# Patient Record
Sex: FEMALE | Race: BLACK OR AFRICAN AMERICAN | NOT HISPANIC OR LATINO | Employment: FULL TIME | URBAN - METROPOLITAN AREA
[De-identification: names, ages, dates, MRNs, and addresses within clinical notes are randomized per-mention and may not be internally consistent; named-entity substitution may affect disease eponyms.]

---

## 2019-02-07 ENCOUNTER — APPOINTMENT (OUTPATIENT)
Dept: LAB | Facility: CLINIC | Age: 24
End: 2019-02-07
Payer: COMMERCIAL

## 2019-02-07 ENCOUNTER — OFFICE VISIT (OUTPATIENT)
Dept: INTERNAL MEDICINE CLINIC | Facility: CLINIC | Age: 24
End: 2019-02-07
Payer: COMMERCIAL

## 2019-02-07 VITALS
DIASTOLIC BLOOD PRESSURE: 74 MMHG | RESPIRATION RATE: 12 BRPM | WEIGHT: 144.8 LBS | BODY MASS INDEX: 24.12 KG/M2 | HEART RATE: 84 BPM | SYSTOLIC BLOOD PRESSURE: 100 MMHG | HEIGHT: 65 IN

## 2019-02-07 DIAGNOSIS — Z00.00 ADULT GENERAL MEDICAL EXAMINATION: ICD-10-CM

## 2019-02-07 DIAGNOSIS — Z13.0 SCREENING FOR ENDOCRINE, NUTRITIONAL, METABOLIC AND IMMUNITY DISORDER: ICD-10-CM

## 2019-02-07 DIAGNOSIS — Z13.228 SCREENING FOR ENDOCRINE, NUTRITIONAL, METABOLIC AND IMMUNITY DISORDER: ICD-10-CM

## 2019-02-07 DIAGNOSIS — Z13.21 SCREENING FOR ENDOCRINE, NUTRITIONAL, METABOLIC AND IMMUNITY DISORDER: ICD-10-CM

## 2019-02-07 DIAGNOSIS — Z13.6 SCREENING FOR CARDIOVASCULAR CONDITION: ICD-10-CM

## 2019-02-07 DIAGNOSIS — Z13.29 SCREENING FOR ENDOCRINE, NUTRITIONAL, METABOLIC AND IMMUNITY DISORDER: ICD-10-CM

## 2019-02-07 DIAGNOSIS — Z11.1 SCREENING FOR TUBERCULOSIS: ICD-10-CM

## 2019-02-07 DIAGNOSIS — Z00.00 ADULT GENERAL MEDICAL EXAMINATION: Primary | ICD-10-CM

## 2019-02-07 LAB
ANION GAP SERPL CALCULATED.3IONS-SCNC: 5 MMOL/L (ref 4–13)
BUN SERPL-MCNC: 11 MG/DL (ref 5–25)
CALCIUM SERPL-MCNC: 8.2 MG/DL (ref 8.3–10.1)
CHLORIDE SERPL-SCNC: 109 MMOL/L (ref 100–108)
CHOLEST SERPL-MCNC: 111 MG/DL (ref 50–200)
CO2 SERPL-SCNC: 24 MMOL/L (ref 21–32)
CREAT SERPL-MCNC: 0.74 MG/DL (ref 0.6–1.3)
ERYTHROCYTE [DISTWIDTH] IN BLOOD BY AUTOMATED COUNT: 16.8 % (ref 11.6–15.1)
GFR SERPL CREATININE-BSD FRML MDRD: 132 ML/MIN/1.73SQ M
GLUCOSE P FAST SERPL-MCNC: 86 MG/DL (ref 65–99)
HCT VFR BLD AUTO: 32.2 % (ref 34.8–46.1)
HDLC SERPL-MCNC: 41 MG/DL (ref 40–60)
HGB BLD-MCNC: 9.5 G/DL (ref 11.5–15.4)
LDLC SERPL CALC-MCNC: 60 MG/DL (ref 0–100)
MCH RBC QN AUTO: 22.7 PG (ref 26.8–34.3)
MCHC RBC AUTO-ENTMCNC: 29.5 G/DL (ref 31.4–37.4)
MCV RBC AUTO: 77 FL (ref 82–98)
NONHDLC SERPL-MCNC: 70 MG/DL
PLATELET # BLD AUTO: 377 THOUSANDS/UL (ref 149–390)
PMV BLD AUTO: 9.7 FL (ref 8.9–12.7)
POTASSIUM SERPL-SCNC: 3.7 MMOL/L (ref 3.5–5.3)
RBC # BLD AUTO: 4.18 MILLION/UL (ref 3.81–5.12)
SODIUM SERPL-SCNC: 138 MMOL/L (ref 136–145)
TRIGL SERPL-MCNC: 51 MG/DL
WBC # BLD AUTO: 6.02 THOUSAND/UL (ref 4.31–10.16)

## 2019-02-07 PROCEDURE — 80061 LIPID PANEL: CPT

## 2019-02-07 PROCEDURE — 99385 PREV VISIT NEW AGE 18-39: CPT | Performed by: INTERNAL MEDICINE

## 2019-02-07 PROCEDURE — 86480 TB TEST CELL IMMUN MEASURE: CPT

## 2019-02-07 PROCEDURE — 85027 COMPLETE CBC AUTOMATED: CPT

## 2019-02-07 PROCEDURE — 36415 COLL VENOUS BLD VENIPUNCTURE: CPT

## 2019-02-07 PROCEDURE — 80048 BASIC METABOLIC PNL TOTAL CA: CPT

## 2019-02-07 NOTE — PATIENT INSTRUCTIONS

## 2019-02-07 NOTE — PROGRESS NOTES
ADULT ANNUAL PHYSICAL  [facility] - [facility] [address] [place] [address]    NAME: Linette Molina  AGE: 21 y o  SEX: female  : 1995     DATE: 2019     Assessment and Plan:     1  Adult general medical examination  2  Screening for endocrine, nutritional, metabolic and immunity disorder  3  Screening for tuberculosis  4  Screening for cardiovascular condition    Orders Placed This Encounter   Procedures    Basic metabolic panel    CBC and Platelet    Lipid panel    Quantiferon TB Gold Plus     Health maintenance and preventative care screenings were discussed with patient today  Appropriate education was printed on patient's after visit summary  ·  Patient is up-to-date with their preventive screenings  · Immunizations were reviewed: patient is up-to-date with her immunizations  Counseling:  Dental Health: discussed importance of regular tooth brushing, flossing, and dental visits  BMI Counseling: Body mass index is 24 42 kg/m²  Discussed the patient's BMI with her  The BMI is in the acceptable range  Sexual health: discussed sexually transmitted diseases, partner selection, use of condoms, avoidance of unintended pregnancy, and contraceptive alternatives  · Alcohol/drug use: discussed moderation in alcohol intake and avoidance of illicit drug use  Chief Complaint:     Chief Complaint   Patient presents with    [facility]      History of Present Illness:     Adult Annual Physical   Patient here for a comprehensive physical exam  The patient reports no problems  She needs testing for school for Tb  She is studying to become a medical assistant  Currently works at [employer]  Denies any medical problems  Has no concerns  Diet and Physical Activity  · Diet/Nutrition: well balanced diet  · Weight concerns: none, patient's BMI is between 18 5-24 9  · Exercise: moderate cardiovascular exercise        Depression Screening  PHQ-9 Depression Screening    PHQ-9:    Frequency of the following problems over the past two weeks:       Little interest or pleasure in doing things:  0 - not at all  Feeling down, depressed, or hopeless:  0 - not at all  PHQ-2 Score:  0       General Health  · Sleep: sleeps well  · Hearing: normal - bilateral   · Vision: goes for regular eye exams, wears glasses and wears contacts  · Dental: regular dental visits and brushes teeth twice daily  /GYN Health  · Last menstrual period: 1/7/2019  · History of STDs?: no      Review of Systems:     Review of Systems   Constitutional: Negative for appetite change, chills, fatigue and fever  HENT: Negative for congestion, hearing loss, postnasal drip, rhinorrhea, sore throat, tinnitus and trouble swallowing  Eyes: Negative for pain, discharge, redness and visual disturbance  Respiratory: Negative for cough, chest tightness, shortness of breath and wheezing  Cardiovascular: Negative for chest pain, palpitations and leg swelling  Gastrointestinal: Negative for abdominal distention, abdominal pain, blood in stool, constipation, diarrhea, nausea and vomiting  Endocrine: Negative for cold intolerance, heat intolerance, polydipsia, polyphagia and polyuria  Genitourinary: Negative for difficulty urinating, dysuria, frequency, hematuria and urgency  Musculoskeletal: Negative for arthralgias, back pain, gait problem, joint swelling, myalgias, neck pain and neck stiffness  Skin: Negative for color change and rash  Neurological: Negative for dizziness, tremors, seizures, syncope, speech difficulty, weakness, light-headedness, numbness and headaches  Hematological: Negative for adenopathy  Does not bruise/bleed easily  Psychiatric/Behavioral: Negative for agitation, behavioral problems, confusion, hallucinations, sleep disturbance and suicidal ideas  The patient is not nervous/anxious  Past Medical History:     History reviewed  No pertinent past medical history     Past Surgical History: History reviewed  No pertinent surgical history  Social History:     Social History     Social History    Marital status: Single     Spouse name: N/A    Number of children: N/A    Years of education: N/A     Social History Main Topics    Smoking status: Never Smoker    Smokeless tobacco: Never Used    Alcohol use No    Drug use: No    Sexual activity: Yes     Other Topics Concern    None     Social History Narrative    None      Family History:     Family History   Problem Relation Age of Onset    No Known Problems Mother     Hypertension Father     Diabetes Father     Prostate cancer Father     Alzheimer's disease Paternal Grandmother       Current Medications:     No current outpatient prescriptions on file  No current facility-administered medications for this visit  Allergies:     No Known Allergies   Objective:     /74 (BP Location: Left arm, Patient Position: Sitting)   Pulse 84   Resp 12   Ht 5' 4 57" (1 64 m)   Wt 65 7 kg (144 lb 12 8 oz)   BMI 24 42 kg/m²     Physical Exam   Constitutional: She is oriented to person, place, and time  She appears well-developed and well-nourished  No distress  HENT:   Head: Normocephalic and atraumatic  Right Ear: External ear normal    Left Ear: External ear normal    Nose: Nose normal    Mouth/Throat: Oropharynx is clear and moist  No oropharyngeal exudate  Eyes: Conjunctivae are normal  Right eye exhibits no discharge  Left eye exhibits no discharge  No scleral icterus  Neck: Neck supple  No JVD present  No thyromegaly present  Cardiovascular: Normal rate, regular rhythm and normal heart sounds  No murmur heard  Pulmonary/Chest: Effort normal and breath sounds normal  No respiratory distress  She has no wheezes  She has no rales  She exhibits no tenderness  Abdominal: Soft  Bowel sounds are normal  She exhibits no distension and no mass  There is no tenderness  There is no rebound and no guarding  No hernia  Musculoskeletal: She exhibits no edema  Lymphadenopathy:     She has no cervical adenopathy  Neurological: She is alert and oriented to person, place, and time  She displays normal reflexes  No cranial nerve deficit or sensory deficit  She exhibits normal muscle tone  Coordination normal    Skin: Skin is warm and dry  She is not diaphoretic  Psychiatric: She has a normal mood and affect  Her behavior is normal    Vitals reviewed       Health Maintenance:     Health Maintenance   Topic Date Due    PAP SMEAR  09/05/2016    Depression Screening PHQ  02/07/2020    DTaP,Tdap,and Td Vaccines (8 - Td) 01/23/2029    INFLUENZA VACCINE  Completed     Immunization History   Administered Date(s) Administered    DTaP 1995, 01/18/1996, 05/06/1996, 02/01/1997, 04/10/2000    HPV Quadrivalent 09/04/2007, 08/18/2008, 07/12/2011    Hep B, Adolescent or Pediatric 1995, 01/18/1996, 05/06/1996    Hepatitis A 07/12/2011    HiB 1995, 01/18/1996, 05/06/1996, 02/01/1997    IPV 1995, 01/18/1996, 02/01/1997, 04/10/2000    Influenza 01/23/2019    MMR 12/21/1996, 08/21/1999, 12/21/2006    Meningococcal MCV4P 09/04/2007    OPV 1995, 01/18/1996, 02/01/1997, 04/10/2000    Tdap 09/04/2007, 01/23/2019    Tuberculin Skin Test-PPD Intradermal 12/21/1996, 05/10/1997, 08/21/1999, 04/10/2002    Varicella 08/21/1999, 07/12/2011       Titi Snyder DO  [facility]

## 2019-02-08 ENCOUNTER — TELEPHONE (OUTPATIENT)
Dept: INTERNAL MEDICINE CLINIC | Facility: CLINIC | Age: 24
End: 2019-02-08

## 2019-02-08 LAB
GAMMA INTERFERON BACKGROUND BLD IA-ACNC: 0.05 IU/ML
M TB IFN-G BLD-IMP: NEGATIVE
M TB IFN-G CD4+ BCKGRND COR BLD-ACNC: -0.01 IU/ML
M TB IFN-G CD4+ BCKGRND COR BLD-ACNC: -0.01 IU/ML
MITOGEN IGNF BCKGRD COR BLD-ACNC: 2.94 IU/ML

## 2019-02-08 NOTE — TELEPHONE ENCOUNTER
----- Message from Yanni West DO sent at 2/8/2019 12:28 PM EST -----  Call patient and let her know quantiferon gold (Tb) testing came back negative  Rest of her labs were also ok except for here complete blood count showed that she has anemia  This is likely due to iron deficiency  Would increase iron intake in her diet  Can take OTC iron tablets or drops 1-3 times per day with meals as tolerated